# Patient Record
Sex: FEMALE | Race: WHITE | NOT HISPANIC OR LATINO
[De-identification: names, ages, dates, MRNs, and addresses within clinical notes are randomized per-mention and may not be internally consistent; named-entity substitution may affect disease eponyms.]

---

## 2019-10-25 ENCOUNTER — APPOINTMENT (OUTPATIENT)
Dept: OBGYN | Facility: CLINIC | Age: 39
End: 2019-10-25

## 2019-11-22 ENCOUNTER — APPOINTMENT (OUTPATIENT)
Dept: ANTEPARTUM | Facility: CLINIC | Age: 39
End: 2019-11-22
Payer: MEDICAID

## 2019-11-22 PROCEDURE — 76817 TRANSVAGINAL US OBSTETRIC: CPT

## 2019-11-22 PROCEDURE — 76811 OB US DETAILED SNGL FETUS: CPT

## 2019-12-09 ENCOUNTER — OUTPATIENT (OUTPATIENT)
Dept: OUTPATIENT SERVICES | Facility: HOSPITAL | Age: 39
LOS: 1 days | End: 2019-12-09
Payer: COMMERCIAL

## 2019-12-09 DIAGNOSIS — O26.899 OTHER SPECIFIED PREGNANCY RELATED CONDITIONS, UNSPECIFIED TRIMESTER: ICD-10-CM

## 2019-12-09 DIAGNOSIS — Z3A.00 WEEKS OF GESTATION OF PREGNANCY NOT SPECIFIED: ICD-10-CM

## 2019-12-09 LAB
APPEARANCE UR: CLEAR — SIGNIFICANT CHANGE UP
BACTERIA # UR AUTO: ABNORMAL /HPF
BILIRUB UR-MCNC: NEGATIVE — SIGNIFICANT CHANGE UP
COLOR SPEC: YELLOW — SIGNIFICANT CHANGE UP
DIFF PNL FLD: ABNORMAL
EPI CELLS # UR: ABNORMAL /HPF
GLUCOSE UR QL: NEGATIVE — SIGNIFICANT CHANGE UP
KETONES UR-MCNC: NEGATIVE — SIGNIFICANT CHANGE UP
LEUKOCYTE ESTERASE UR-ACNC: NEGATIVE — SIGNIFICANT CHANGE UP
NITRITE UR-MCNC: NEGATIVE — SIGNIFICANT CHANGE UP
PH UR: 7 — SIGNIFICANT CHANGE UP (ref 5–8)
PROT UR-MCNC: NEGATIVE — SIGNIFICANT CHANGE UP
RBC CASTS # UR COMP ASSIST: ABNORMAL /HPF (ref 0–2)
SP GR SPEC: 1.01 — SIGNIFICANT CHANGE UP (ref 1.01–1.02)
UROBILINOGEN FLD QL: NEGATIVE — SIGNIFICANT CHANGE UP
WBC UR QL: SIGNIFICANT CHANGE UP /HPF (ref 0–5)

## 2019-12-09 PROCEDURE — G0463: CPT

## 2019-12-09 PROCEDURE — 59025 FETAL NON-STRESS TEST: CPT

## 2019-12-09 PROCEDURE — 81001 URINALYSIS AUTO W/SCOPE: CPT

## 2019-12-09 PROCEDURE — 99282 EMERGENCY DEPT VISIT SF MDM: CPT

## 2019-12-20 ENCOUNTER — APPOINTMENT (OUTPATIENT)
Dept: OBGYN | Facility: CLINIC | Age: 39
End: 2019-12-20
Payer: MEDICAID

## 2019-12-20 PROBLEM — Z78.9 OTHER SPECIFIED HEALTH STATUS: Chronic | Status: ACTIVE | Noted: 2019-12-09

## 2019-12-20 PROCEDURE — 0502F SUBSEQUENT PRENATAL CARE: CPT

## 2020-01-10 ENCOUNTER — APPOINTMENT (OUTPATIENT)
Dept: OBGYN | Facility: CLINIC | Age: 40
End: 2020-01-10

## 2020-01-17 ENCOUNTER — APPOINTMENT (OUTPATIENT)
Dept: OBGYN | Facility: CLINIC | Age: 40
End: 2020-01-17

## 2020-01-21 ENCOUNTER — APPOINTMENT (OUTPATIENT)
Dept: ANTEPARTUM | Facility: CLINIC | Age: 40
End: 2020-01-21
Payer: MEDICAID

## 2020-01-21 PROCEDURE — 76819 FETAL BIOPHYS PROFIL W/O NST: CPT

## 2020-02-04 ENCOUNTER — APPOINTMENT (OUTPATIENT)
Dept: OBGYN | Facility: CLINIC | Age: 40
End: 2020-02-04
Payer: MEDICAID

## 2020-02-04 PROCEDURE — 76819 FETAL BIOPHYS PROFIL W/O NST: CPT

## 2020-02-04 PROCEDURE — 76816 OB US FOLLOW-UP PER FETUS: CPT

## 2020-02-14 PROBLEM — Z00.00 ENCOUNTER FOR PREVENTIVE HEALTH EXAMINATION: Status: ACTIVE | Noted: 2020-02-14

## 2020-02-21 ENCOUNTER — APPOINTMENT (OUTPATIENT)
Dept: OBGYN | Facility: CLINIC | Age: 40
End: 2020-02-21

## 2020-03-06 ENCOUNTER — APPOINTMENT (OUTPATIENT)
Dept: OBGYN | Facility: CLINIC | Age: 40
End: 2020-03-06

## 2020-03-08 ENCOUNTER — OUTPATIENT (OUTPATIENT)
Dept: OUTPATIENT SERVICES | Facility: HOSPITAL | Age: 40
LOS: 1 days | End: 2020-03-08
Payer: COMMERCIAL

## 2020-03-08 DIAGNOSIS — O26.899 OTHER SPECIFIED PREGNANCY RELATED CONDITIONS, UNSPECIFIED TRIMESTER: ICD-10-CM

## 2020-03-08 DIAGNOSIS — Z3A.00 WEEKS OF GESTATION OF PREGNANCY NOT SPECIFIED: ICD-10-CM

## 2020-03-08 PROCEDURE — 59025 FETAL NON-STRESS TEST: CPT

## 2020-03-08 PROCEDURE — 59025 FETAL NON-STRESS TEST: CPT | Mod: 26

## 2020-03-08 PROCEDURE — 82962 GLUCOSE BLOOD TEST: CPT

## 2020-03-08 PROCEDURE — G0463: CPT

## 2020-03-13 ENCOUNTER — APPOINTMENT (OUTPATIENT)
Dept: ANTEPARTUM | Facility: CLINIC | Age: 40
End: 2020-03-13
Payer: MEDICAID

## 2020-03-13 PROCEDURE — 76818 FETAL BIOPHYS PROFILE W/NST: CPT

## 2020-03-17 ENCOUNTER — APPOINTMENT (OUTPATIENT)
Dept: ANTEPARTUM | Facility: CLINIC | Age: 40
End: 2020-03-17
Payer: MEDICAID

## 2020-03-17 ENCOUNTER — APPOINTMENT (OUTPATIENT)
Dept: OBGYN | Facility: CLINIC | Age: 40
End: 2020-03-17

## 2020-03-17 PROCEDURE — 59025 FETAL NON-STRESS TEST: CPT

## 2020-03-20 ENCOUNTER — APPOINTMENT (OUTPATIENT)
Dept: ANTEPARTUM | Facility: CLINIC | Age: 40
End: 2020-03-20
Payer: MEDICAID

## 2020-03-20 PROCEDURE — 76819 FETAL BIOPHYS PROFIL W/O NST: CPT

## 2020-03-20 PROCEDURE — 76816 OB US FOLLOW-UP PER FETUS: CPT

## 2020-03-27 ENCOUNTER — APPOINTMENT (OUTPATIENT)
Dept: OBGYN | Facility: CLINIC | Age: 40
End: 2020-03-27
Payer: MEDICAID

## 2020-03-27 PROCEDURE — 0502F SUBSEQUENT PRENATAL CARE: CPT

## 2020-04-03 ENCOUNTER — APPOINTMENT (OUTPATIENT)
Dept: OBGYN | Facility: CLINIC | Age: 40
End: 2020-04-03
Payer: MEDICAID

## 2020-04-03 PROCEDURE — 76818 FETAL BIOPHYS PROFILE W/NST: CPT

## 2020-04-04 ENCOUNTER — INPATIENT (INPATIENT)
Facility: HOSPITAL | Age: 40
LOS: 2 days | Discharge: HOME | End: 2020-04-07
Attending: OBSTETRICS & GYNECOLOGY | Admitting: OBSTETRICS & GYNECOLOGY
Payer: MEDICAID

## 2020-04-04 VITALS
SYSTOLIC BLOOD PRESSURE: 124 MMHG | RESPIRATION RATE: 16 BRPM | HEART RATE: 88 BPM | TEMPERATURE: 98 F | DIASTOLIC BLOOD PRESSURE: 67 MMHG

## 2020-04-04 DIAGNOSIS — O26.899 OTHER SPECIFIED PREGNANCY RELATED CONDITIONS, UNSPECIFIED TRIMESTER: ICD-10-CM

## 2020-04-04 DIAGNOSIS — Z3A.00 WEEKS OF GESTATION OF PREGNANCY NOT SPECIFIED: ICD-10-CM

## 2020-04-04 LAB — GLUCOSE BLDC GLUCOMTR-MCNC: 97 MG/DL — SIGNIFICANT CHANGE UP (ref 70–99)

## 2020-04-04 RX ORDER — SODIUM CHLORIDE 9 MG/ML
1000 INJECTION, SOLUTION INTRAVENOUS
Refills: 0 | Status: DISCONTINUED | OUTPATIENT
Start: 2020-04-04 | End: 2020-04-04

## 2020-04-04 RX ORDER — OXYTOCIN 10 UNIT/ML
333.33 VIAL (ML) INJECTION
Qty: 20 | Refills: 0 | Status: DISCONTINUED | OUTPATIENT
Start: 2020-04-04 | End: 2020-04-07

## 2020-04-04 RX ORDER — SODIUM CHLORIDE 9 MG/ML
1000 INJECTION, SOLUTION INTRAVENOUS
Refills: 0 | Status: DISCONTINUED | OUTPATIENT
Start: 2020-04-04 | End: 2020-04-07

## 2020-04-04 NOTE — OB PROVIDER TRIAGE NOTE - NSOBPROVIDERNOTE_OBGYN_ALL_OB_FT
Pt of Dr. Graham: 38yo , at 39.2wks, presents to triage due to decreased fetal movement since early this afternoon. Pt reports feeling some movement now, but not as normal. Denies any vaginal bleeding or leakage of fluid. Pt is scheduled for Induction of Labor secondary to GDMA2  and cholestasis.    Allergies: denies  Meds: PNV, Basaglar 20units hs  PMH: denies  PSH: denies  OBGYN  -sab x 2  -Denies any GYN hx or STIs  -Current AP complications: GDMA2; Cholestasis    Assessment/Plan    VS: /68, HR 73, T 36.5  Lungs clear bilaterally, normal heart rate and rhythm  SVE: 2/60/-3  TAS: anterior placenta, vertex, BPP=8/8; RAMANA= 9.78cm; positive fetal movement seen and felt by patient  NST: , moderate variability, accels, no decels  Occasional contraction seen on toco  [id number]    -D/w Dr. Rodas; positive fetal movement. Pt admitted for scheduled IOL for GDMA2 and Cholestasis  -Routine labs  -PO cytotec  -GBS negative status  -Accuchecks and rotating fluids as per protocol

## 2020-04-04 NOTE — OB PROVIDER TRIAGE NOTE - NSHPPHYSICALEXAM_GEN_ALL_CORE
VS: /68, HR 73, T 36.5  Lungs clear bilaterally, normal heart rate and rhythm  SVE: 2/60/-3  TAS: anterior placenta, vertex, BPP=8/8; RAMANA= 9.78cm; positive fetal movement seen and felt by patient  NST: , moderate variability, accels, no decels  Occasional contraction seen on toco

## 2020-04-04 NOTE — OB PROVIDER TRIAGE NOTE - HISTORY OF PRESENT ILLNESS
Pt of Dr. Graham: 38yo , at 39.2wks, presents to triage due to decreased fetal movement since early this afternoon. Pt reports feeling some movement now, but not as normal. Denies any vaginal bleeding or leakage of fluid. Pt is scheduled for Induction of Labor secondary to GDMA2  and cholestasis.    Allergies: denies  Meds: PNV, Basaglar 20units hs  PMH: denies  PSH: denies  OBGYN  -sab x 2  -Denies any GYN hx or STIs  -Current AP complications: GDMA2; Cholestasis

## 2020-04-04 NOTE — OB RN TRIAGE NOTE - NS_TRIAGEADDITIONAL COMMENTS_OBGYN_ALL_OB_FT
pt for scheduled induction at 10p, came in early for decreased fetal movement pt for scheduled induction at 10p, came in early for decreased fetal movement FHR check prior to admitting procedure  145bpm @ 2050

## 2020-04-05 ENCOUNTER — TRANSCRIPTION ENCOUNTER (OUTPATIENT)
Age: 40
End: 2020-04-05

## 2020-04-05 LAB
BASOPHILS # BLD AUTO: 0.01 K/UL — SIGNIFICANT CHANGE UP (ref 0–0.2)
BASOPHILS NFR BLD AUTO: 0.1 % — SIGNIFICANT CHANGE UP (ref 0–2)
BLD GP AB SCN SERPL QL: NEGATIVE — SIGNIFICANT CHANGE UP
EOSINOPHIL # BLD AUTO: 0.02 K/UL — SIGNIFICANT CHANGE UP (ref 0–0.5)
EOSINOPHIL NFR BLD AUTO: 0.2 % — SIGNIFICANT CHANGE UP (ref 0–6)
GLUCOSE BLDC GLUCOMTR-MCNC: 79 MG/DL — SIGNIFICANT CHANGE UP (ref 70–99)
GLUCOSE BLDC GLUCOMTR-MCNC: 88 MG/DL — SIGNIFICANT CHANGE UP (ref 70–99)
GLUCOSE BLDC GLUCOMTR-MCNC: 89 MG/DL — SIGNIFICANT CHANGE UP (ref 70–99)
GLUCOSE BLDC GLUCOMTR-MCNC: 92 MG/DL — SIGNIFICANT CHANGE UP (ref 70–99)
GLUCOSE BLDC GLUCOMTR-MCNC: 95 MG/DL — SIGNIFICANT CHANGE UP (ref 70–99)
GLUCOSE BLDC GLUCOMTR-MCNC: 96 MG/DL — SIGNIFICANT CHANGE UP (ref 70–99)
HCT VFR BLD CALC: 38.1 % — SIGNIFICANT CHANGE UP (ref 34.5–45)
HGB BLD-MCNC: 12.5 G/DL — SIGNIFICANT CHANGE UP (ref 11.5–15.5)
IMM GRANULOCYTES NFR BLD AUTO: 0.9 % — SIGNIFICANT CHANGE UP (ref 0–1.5)
LYMPHOCYTES # BLD AUTO: 1.73 K/UL — SIGNIFICANT CHANGE UP (ref 1–3.3)
LYMPHOCYTES # BLD AUTO: 18.9 % — SIGNIFICANT CHANGE UP (ref 13–44)
MCHC RBC-ENTMCNC: 29.9 PG — SIGNIFICANT CHANGE UP (ref 27–34)
MCHC RBC-ENTMCNC: 32.8 % — SIGNIFICANT CHANGE UP (ref 32–36)
MCV RBC AUTO: 91.1 FL — SIGNIFICANT CHANGE UP (ref 80–100)
MONOCYTES # BLD AUTO: 0.61 K/UL — SIGNIFICANT CHANGE UP (ref 0–0.9)
MONOCYTES NFR BLD AUTO: 6.7 % — SIGNIFICANT CHANGE UP (ref 2–14)
NEUTROPHILS # BLD AUTO: 6.7 K/UL — SIGNIFICANT CHANGE UP (ref 1.8–7.4)
NEUTROPHILS NFR BLD AUTO: 73.2 % — SIGNIFICANT CHANGE UP (ref 43–77)
NRBC # FLD: 0 K/UL — SIGNIFICANT CHANGE UP (ref 0–0)
PLATELET # BLD AUTO: 202 K/UL — SIGNIFICANT CHANGE UP (ref 150–400)
PMV BLD: 10.5 FL — SIGNIFICANT CHANGE UP (ref 7–13)
RBC # BLD: 4.18 M/UL — SIGNIFICANT CHANGE UP (ref 3.8–5.2)
RBC # FLD: 15.3 % — HIGH (ref 10.3–14.5)
RH IG SCN BLD-IMP: POSITIVE — SIGNIFICANT CHANGE UP
RH IG SCN BLD-IMP: POSITIVE — SIGNIFICANT CHANGE UP
SARS-COV-2 RNA SPEC QL NAA+PROBE: SIGNIFICANT CHANGE UP
WBC # BLD: 9.15 K/UL — SIGNIFICANT CHANGE UP (ref 3.8–10.5)
WBC # FLD AUTO: 9.15 K/UL — SIGNIFICANT CHANGE UP (ref 3.8–10.5)

## 2020-04-05 PROCEDURE — 59400 OBSTETRICAL CARE: CPT | Mod: U9

## 2020-04-05 RX ORDER — LANOLIN
1 OINTMENT (GRAM) TOPICAL EVERY 6 HOURS
Refills: 0 | Status: DISCONTINUED | OUTPATIENT
Start: 2020-04-05 | End: 2020-04-07

## 2020-04-05 RX ORDER — DIBUCAINE 1 %
1 OINTMENT (GRAM) RECTAL EVERY 6 HOURS
Refills: 0 | Status: DISCONTINUED | OUTPATIENT
Start: 2020-04-05 | End: 2020-04-07

## 2020-04-05 RX ORDER — TETANUS TOXOID, REDUCED DIPHTHERIA TOXOID AND ACELLULAR PERTUSSIS VACCINE, ADSORBED 5; 2.5; 8; 8; 2.5 [IU]/.5ML; [IU]/.5ML; UG/.5ML; UG/.5ML; UG/.5ML
0.5 SUSPENSION INTRAMUSCULAR ONCE
Refills: 0 | Status: DISCONTINUED | OUTPATIENT
Start: 2020-04-05 | End: 2020-04-07

## 2020-04-05 RX ORDER — MAGNESIUM HYDROXIDE 400 MG/1
30 TABLET, CHEWABLE ORAL
Refills: 0 | Status: DISCONTINUED | OUTPATIENT
Start: 2020-04-05 | End: 2020-04-07

## 2020-04-05 RX ORDER — SIMETHICONE 80 MG/1
80 TABLET, CHEWABLE ORAL EVERY 4 HOURS
Refills: 0 | Status: DISCONTINUED | OUTPATIENT
Start: 2020-04-05 | End: 2020-04-07

## 2020-04-05 RX ORDER — CARBOPROST TROMETHAMINE 250 UG/ML
250 INJECTION, SOLUTION INTRAMUSCULAR ONCE
Refills: 0 | Status: COMPLETED | OUTPATIENT
Start: 2020-04-05 | End: 2020-04-05

## 2020-04-05 RX ORDER — BENZOCAINE 10 %
1 GEL (GRAM) MUCOUS MEMBRANE EVERY 6 HOURS
Refills: 0 | Status: DISCONTINUED | OUTPATIENT
Start: 2020-04-05 | End: 2020-04-07

## 2020-04-05 RX ORDER — IBUPROFEN 200 MG
1 TABLET ORAL
Qty: 0 | Refills: 0 | DISCHARGE
Start: 2020-04-05

## 2020-04-05 RX ORDER — OXYTOCIN 10 UNIT/ML
1 VIAL (ML) INJECTION
Qty: 30 | Refills: 0 | Status: DISCONTINUED | OUTPATIENT
Start: 2020-04-05 | End: 2020-04-05

## 2020-04-05 RX ORDER — IBUPROFEN 200 MG
600 TABLET ORAL EVERY 6 HOURS
Refills: 0 | Status: DISCONTINUED | OUTPATIENT
Start: 2020-04-05 | End: 2020-04-07

## 2020-04-05 RX ORDER — OXYTOCIN 10 UNIT/ML
2 VIAL (ML) INJECTION
Qty: 30 | Refills: 0 | Status: DISCONTINUED | OUTPATIENT
Start: 2020-04-05 | End: 2020-04-07

## 2020-04-05 RX ORDER — OXYTOCIN 10 UNIT/ML
333.33 VIAL (ML) INJECTION
Qty: 20 | Refills: 0 | Status: DISCONTINUED | OUTPATIENT
Start: 2020-04-05 | End: 2020-04-07

## 2020-04-05 RX ORDER — AER TRAVELER 0.5 G/1
1 SOLUTION RECTAL; TOPICAL EVERY 4 HOURS
Refills: 0 | Status: DISCONTINUED | OUTPATIENT
Start: 2020-04-05 | End: 2020-04-07

## 2020-04-05 RX ORDER — DIPHENOXYLATE HCL/ATROPINE 2.5-.025MG
2 TABLET ORAL ONCE
Refills: 0 | Status: DISCONTINUED | OUTPATIENT
Start: 2020-04-05 | End: 2020-04-05

## 2020-04-05 RX ORDER — KETOROLAC TROMETHAMINE 30 MG/ML
30 SYRINGE (ML) INJECTION ONCE
Refills: 0 | Status: DISCONTINUED | OUTPATIENT
Start: 2020-04-05 | End: 2020-04-07

## 2020-04-05 RX ORDER — DIPHENHYDRAMINE HCL 50 MG
25 CAPSULE ORAL EVERY 6 HOURS
Refills: 0 | Status: DISCONTINUED | OUTPATIENT
Start: 2020-04-05 | End: 2020-04-07

## 2020-04-05 RX ORDER — ACETAMINOPHEN 500 MG
975 TABLET ORAL
Refills: 0 | Status: DISCONTINUED | OUTPATIENT
Start: 2020-04-05 | End: 2020-04-07

## 2020-04-05 RX ORDER — GLYCERIN ADULT
1 SUPPOSITORY, RECTAL RECTAL AT BEDTIME
Refills: 0 | Status: DISCONTINUED | OUTPATIENT
Start: 2020-04-05 | End: 2020-04-07

## 2020-04-05 RX ORDER — OXYCODONE HYDROCHLORIDE 5 MG/1
5 TABLET ORAL ONCE
Refills: 0 | Status: DISCONTINUED | OUTPATIENT
Start: 2020-04-05 | End: 2020-04-07

## 2020-04-05 RX ORDER — ACETAMINOPHEN 500 MG
3 TABLET ORAL
Qty: 0 | Refills: 0 | DISCHARGE
Start: 2020-04-05

## 2020-04-05 RX ORDER — SODIUM CHLORIDE 9 MG/ML
3 INJECTION INTRAMUSCULAR; INTRAVENOUS; SUBCUTANEOUS EVERY 8 HOURS
Refills: 0 | Status: DISCONTINUED | OUTPATIENT
Start: 2020-04-05 | End: 2020-04-07

## 2020-04-05 RX ORDER — PRAMOXINE HYDROCHLORIDE 150 MG/15G
1 AEROSOL, FOAM RECTAL EVERY 4 HOURS
Refills: 0 | Status: DISCONTINUED | OUTPATIENT
Start: 2020-04-05 | End: 2020-04-07

## 2020-04-05 RX ORDER — INSULIN GLARGINE 100 [IU]/ML
20 INJECTION, SOLUTION SUBCUTANEOUS
Qty: 0 | Refills: 0 | DISCHARGE

## 2020-04-05 RX ORDER — HYDROCORTISONE 1 %
1 OINTMENT (GRAM) TOPICAL EVERY 6 HOURS
Refills: 0 | Status: DISCONTINUED | OUTPATIENT
Start: 2020-04-05 | End: 2020-04-07

## 2020-04-05 RX ORDER — OXYCODONE HYDROCHLORIDE 5 MG/1
5 TABLET ORAL
Refills: 0 | Status: DISCONTINUED | OUTPATIENT
Start: 2020-04-05 | End: 2020-04-07

## 2020-04-05 RX ADMIN — Medication 2 MILLIUNIT(S)/MIN: at 17:39

## 2020-04-05 RX ADMIN — SODIUM CHLORIDE 125 MILLILITER(S): 9 INJECTION, SOLUTION INTRAVENOUS at 01:29

## 2020-04-05 RX ADMIN — SODIUM CHLORIDE 125 MILLILITER(S): 9 INJECTION, SOLUTION INTRAVENOUS at 05:24

## 2020-04-05 RX ADMIN — Medication 0.2 MILLIGRAM(S): at 22:45

## 2020-04-05 RX ADMIN — CARBOPROST TROMETHAMINE 250 MICROGRAM(S): 250 INJECTION, SOLUTION INTRAMUSCULAR at 23:13

## 2020-04-05 RX ADMIN — Medication 2 TABLET(S): at 23:22

## 2020-04-05 NOTE — DISCHARGE NOTE OB - MEDICATION SUMMARY - MEDICATIONS TO TAKE
I will START or STAY ON the medications listed below when I get home from the hospital:    acetaminophen 325 mg oral tablet  -- 3 tab(s) by mouth , As Needed  -- Indication: For VAGINAL DELIVERY    ibuprofen 600 mg oral tablet  -- 1 tab(s) by mouth every 6 hours, As Needed  -- Indication: For VAGINAL DELIVERY

## 2020-04-05 NOTE — DISCHARGE NOTE OB - MEDICATION SUMMARY - MEDICATIONS TO STOP TAKING
I will STOP taking the medications listed below when I get home from the hospital:    Basaglar KwikPen  -- 20 unit(s) subcutaneous once a day (at bedtime)

## 2020-04-05 NOTE — DISCHARGE NOTE OB - MATERIALS PROVIDED
[facility] Hearing Screen Program/MMR Vaccination (VIS Pub Date: 2012)/Tdap Vaccination (VIS Pub Date: 2012)/Vaccinations/[facility]  Screening Program/Shaken Baby Prevention Handout/Breastfeeding Log/Guide to Postpartum Care

## 2020-04-05 NOTE — OB RN DELIVERY SUMMARY - NS_SEPSISRSKCALC_OBGYN_ALL_OB_FT
EOS calculated successfully. EOS Risk Factor: 0.5/1000 live births ([facility] [place] national incidence); GA=39w3d; Temp=98.96; ROM=0.45; GBS='Negative'; Antibiotics='No antibiotics or any antibiotics < 2 hrs prior to birth'

## 2020-04-05 NOTE — CHART NOTE - NSCHARTNOTEFT_GEN_A_CORE
40 yo , EGA@39.3 weeks c/o increased pain intensity with contractions; requesting an epidural.  Labor induction with PO Cytotec for ICP; Vital Signs: T(C): 36.5 (2020 13:39), Max: 37 (2020 01:35)  T(F): 97.7 (2020 13:39), Max: 98.6 (2020 01:35)  HR: 70 (2020 13:39) (61 - 88)  BP: 115/54 (2020 13:39) (98/62 - 124/67)  RR: 17 (2020 13:39) (16 - 17)  SpO2: 99% (2020 13:39) (97% - 99%)  category 1 FHTs, contractions every 3-5 minutes; SVE: 3/100/-1, bulging bag  Case was reviewed with Dr Graham, patient is approved for epidural; senior residents were updated; epidural is pending patient's trnasfer to L&D.    Yandy Murray CNM

## 2020-04-05 NOTE — OB RN PATIENT PROFILE - NS_PRENATALLABSOURCEGBS36_OBGYN_ALL_OB
Excessive Diarrhea/Pain not relieved by Medications/Increased Irritability or Sluggishness/Numbness, tingling/Bleeding that does not stop/Unable to Urinate/Inability to Tolerate Liquids or Foods/Swelling that continues/Persistent Nausea and Vomiting/Numbness, color, or temperature change to extremity/Fever greater than 101
hard copy, drawn during this pregnancy

## 2020-04-05 NOTE — DISCHARGE NOTE OB - CHANGE SANITARY PADS FREQUENTLY.  WASHING AND WIPING SHOULD OCCUR FROM FRONT TO BACK
Spinal Block Start time: 1/22/2019 7:35 AM 
End time: 1/22/2019 7:56 AM 
Performed by: Ronaldo Prado MD 
Authorized by: Ronaldo Prado MD  
 
Pre-procedure: Indications: primary anesthetic  Preanesthetic Checklist: patient identified, risks and benefits discussed, anesthesia consent, site marked, patient being monitored and timeout performed Spinal Block:  
Patient Position:  Seated Prep Region:  Lumbar Prep: DuraPrep Location:  L1-2 Technique:  Single shot Needle:  
Needle Type:  Quincke Needle Gauge:  22 G Attempts:  3 or more Events: CSF confirmed, no blood with aspiration and no paresthesia Assessment: 
Insertion:  Uncomplicated Patient tolerance:  Patient tolerated the procedure well with no immediate complications Statement Selected

## 2020-04-05 NOTE — PROGRESS NOTE ADULT - SUBJECTIVE AND OBJECTIVE BOX
Patient seen and examined.  No complains  On oral cytotec and FHR Tracing is category I.  Will try to place cervical balloon and continue induction of labor

## 2020-04-05 NOTE — DISCHARGE NOTE OB - PATIENT PORTAL LINK FT
You can access the FollowMyHealth Patient Portal offered by [facility] by registering at the following website: [email or web address]. By joining [facility]’s FollowMyHealth portal, you will also be able to view your health information using other applications (apps) compatible with our system.

## 2020-04-05 NOTE — DISCHARGE NOTE OB - CARE PLAN
Principal Discharge DX:	Vaginal delivery  Goal:	Recovery  Assessment and plan of treatment:	Nothing per vagina for 6wks  Secondary Diagnosis:	Postpartum hemorrhage, unspecified type

## 2020-04-05 NOTE — CHART NOTE - NSCHARTNOTEFT_GEN_A_CORE
r4 ob note    pt examined at bedside for early decelerations    SVE: /0  EFM:150/modvar/+acc/recurrent early dec  [place]:q2min    anticipate   Shannon, PGY-4  d/w Dr. Corbett

## 2020-04-05 NOTE — DISCHARGE NOTE OB - CARE PROVIDER_API CALL
Fidencio Graham)  MaternalFetal Medicine; Obstetrics and Gynecology  [address] [place] [address]  Phone: [phone]  Fax: [phone]  Follow Up Time:

## 2020-04-05 NOTE — OB RN DELIVERY SUMMARY - NSSKINTOSKINA_OBGYN_ALL_OB_DIFF_HHMM
07/01/18 1515   Provider Notification   Provider Name/Title Dr. Cespedes   Method of Notification At Bedside     MD at bedside discussing nonreactive NST. Per MD patient has option to stay for IOL, or have BPP to determine wellbeing. Patient would like BPP at this time. Per patient, believes she may have experienced SROM this morning, but has not noticed leaking since, fern test ordered due to spotting.   0 Hour(s) 10 Minute(s)

## 2020-04-05 NOTE — OB PROVIDER DELIVERY SUMMARY - NSPROVIDERDELIVERYNOTE_OBGYN_ALL_OB_FT
Viable male infant  PPH of 1L due to uterine atony. Cytotec, methergine, carboprost, and additional 20units of Pitocin administered with good effect.  Will continue Methergine series for 24hrs.

## 2020-04-05 NOTE — CHART NOTE - NSCHARTNOTEFT_GEN_A_CORE
OB Attg  Pt feels some pressure  , pos accels, moderate variability, no decels  toco q2-4min  VE FD/+1  Anticipate

## 2020-04-06 LAB
BASOPHILS # BLD AUTO: 0.03 K/UL — SIGNIFICANT CHANGE UP (ref 0–0.2)
BASOPHILS NFR BLD AUTO: 0.2 % — SIGNIFICANT CHANGE UP (ref 0–2)
EOSINOPHIL # BLD AUTO: 0.09 K/UL — SIGNIFICANT CHANGE UP (ref 0–0.5)
EOSINOPHIL NFR BLD AUTO: 0.5 % — SIGNIFICANT CHANGE UP (ref 0–6)
GLUCOSE BLDC GLUCOMTR-MCNC: 91 MG/DL — SIGNIFICANT CHANGE UP (ref 70–99)
HCT VFR BLD CALC: 26.4 % — LOW (ref 34.5–45)
HCT VFR BLD CALC: 29.5 % — LOW (ref 34.5–45)
HCT VFR BLD CALC: 36.1 % — SIGNIFICANT CHANGE UP (ref 34.5–45)
HGB BLD-MCNC: 11.7 G/DL — SIGNIFICANT CHANGE UP (ref 11.5–15.5)
HGB BLD-MCNC: 8.6 G/DL — LOW (ref 11.5–15.5)
HGB BLD-MCNC: 9.6 G/DL — LOW (ref 11.5–15.5)
IMM GRANULOCYTES NFR BLD AUTO: 0.4 % — SIGNIFICANT CHANGE UP (ref 0–1.5)
LYMPHOCYTES # BLD AUTO: 1.26 K/UL — SIGNIFICANT CHANGE UP (ref 1–3.3)
LYMPHOCYTES # BLD AUTO: 7 % — LOW (ref 13–44)
MCHC RBC-ENTMCNC: 29.8 PG — SIGNIFICANT CHANGE UP (ref 27–34)
MCHC RBC-ENTMCNC: 30.1 PG — SIGNIFICANT CHANGE UP (ref 27–34)
MCHC RBC-ENTMCNC: 30.2 PG — SIGNIFICANT CHANGE UP (ref 27–34)
MCHC RBC-ENTMCNC: 32.4 % — SIGNIFICANT CHANGE UP (ref 32–36)
MCHC RBC-ENTMCNC: 32.5 % — SIGNIFICANT CHANGE UP (ref 32–36)
MCHC RBC-ENTMCNC: 32.6 % — SIGNIFICANT CHANGE UP (ref 32–36)
MCV RBC AUTO: 91.3 FL — SIGNIFICANT CHANGE UP (ref 80–100)
MCV RBC AUTO: 92.5 FL — SIGNIFICANT CHANGE UP (ref 80–100)
MCV RBC AUTO: 93 FL — SIGNIFICANT CHANGE UP (ref 80–100)
MONOCYTES # BLD AUTO: 1.23 K/UL — HIGH (ref 0–0.9)
MONOCYTES NFR BLD AUTO: 6.8 % — SIGNIFICANT CHANGE UP (ref 2–14)
NEUTROPHILS # BLD AUTO: 15.34 K/UL — HIGH (ref 1.8–7.4)
NEUTROPHILS NFR BLD AUTO: 85.1 % — HIGH (ref 43–77)
NRBC # FLD: 0 K/UL — SIGNIFICANT CHANGE UP (ref 0–0)
PLATELET # BLD AUTO: 173 K/UL — SIGNIFICANT CHANGE UP (ref 150–400)
PLATELET # BLD AUTO: 175 K/UL — SIGNIFICANT CHANGE UP (ref 150–400)
PLATELET # BLD AUTO: 176 K/UL — SIGNIFICANT CHANGE UP (ref 150–400)
PMV BLD: 10.3 FL — SIGNIFICANT CHANGE UP (ref 7–13)
PMV BLD: 10.8 FL — SIGNIFICANT CHANGE UP (ref 7–13)
PMV BLD: 11.1 FL — SIGNIFICANT CHANGE UP (ref 7–13)
RBC # BLD: 2.89 M/UL — LOW (ref 3.8–5.2)
RBC # BLD: 3.19 M/UL — LOW (ref 3.8–5.2)
RBC # BLD: 3.88 M/UL — SIGNIFICANT CHANGE UP (ref 3.8–5.2)
RBC # FLD: 15.1 % — HIGH (ref 10.3–14.5)
T PALLIDUM AB TITR SER: NEGATIVE — SIGNIFICANT CHANGE UP
WBC # BLD: 14.39 K/UL — HIGH (ref 3.8–10.5)
WBC # BLD: 18.02 K/UL — HIGH (ref 3.8–10.5)
WBC # BLD: 18.92 K/UL — HIGH (ref 3.8–10.5)
WBC # FLD AUTO: 14.39 K/UL — HIGH (ref 3.8–10.5)
WBC # FLD AUTO: 18.02 K/UL — HIGH (ref 3.8–10.5)
WBC # FLD AUTO: 18.92 K/UL — HIGH (ref 3.8–10.5)

## 2020-04-06 RX ORDER — ONDANSETRON 8 MG/1
4 TABLET, FILM COATED ORAL ONCE
Refills: 0 | Status: COMPLETED | OUTPATIENT
Start: 2020-04-06 | End: 2020-04-06

## 2020-04-06 RX ORDER — FERROUS SULFATE 325(65) MG
325 TABLET ORAL THREE TIMES A DAY
Refills: 0 | Status: DISCONTINUED | OUTPATIENT
Start: 2020-04-06 | End: 2020-04-07

## 2020-04-06 RX ORDER — SODIUM CHLORIDE 9 MG/ML
1000 INJECTION, SOLUTION INTRAVENOUS ONCE
Refills: 0 | Status: COMPLETED | OUTPATIENT
Start: 2020-04-06 | End: 2020-04-06

## 2020-04-06 RX ORDER — ASCORBIC ACID 60 MG
500 TABLET,CHEWABLE ORAL DAILY
Refills: 0 | Status: DISCONTINUED | OUTPATIENT
Start: 2020-04-06 | End: 2020-04-07

## 2020-04-06 RX ADMIN — SODIUM CHLORIDE 3 MILLILITER(S): 9 INJECTION INTRAMUSCULAR; INTRAVENOUS; SUBCUTANEOUS at 18:21

## 2020-04-06 RX ADMIN — Medication 0.2 MILLIGRAM(S): at 03:01

## 2020-04-06 RX ADMIN — Medication 975 MILLIGRAM(S): at 08:23

## 2020-04-06 RX ADMIN — SODIUM CHLORIDE 3 MILLILITER(S): 9 INJECTION INTRAMUSCULAR; INTRAVENOUS; SUBCUTANEOUS at 15:49

## 2020-04-06 RX ADMIN — SODIUM CHLORIDE 1000 MILLILITER(S): 9 INJECTION, SOLUTION INTRAVENOUS at 00:20

## 2020-04-06 RX ADMIN — Medication 975 MILLIGRAM(S): at 20:27

## 2020-04-06 RX ADMIN — Medication 0.2 MILLIGRAM(S): at 12:33

## 2020-04-06 RX ADMIN — Medication 0.2 MILLIGRAM(S): at 07:06

## 2020-04-06 RX ADMIN — Medication 1000 MILLIUNIT(S)/MIN: at 00:13

## 2020-04-06 RX ADMIN — Medication 1 TABLET(S): at 12:33

## 2020-04-06 RX ADMIN — ONDANSETRON 4 MILLIGRAM(S): 8 TABLET, FILM COATED ORAL at 00:05

## 2020-04-06 RX ADMIN — SODIUM CHLORIDE 3 MILLILITER(S): 9 INJECTION INTRAMUSCULAR; INTRAVENOUS; SUBCUTANEOUS at 22:00

## 2020-04-06 RX ADMIN — Medication 0.2 MILLIGRAM(S): at 15:49

## 2020-04-06 RX ADMIN — Medication 0.2 MILLIGRAM(S): at 18:21

## 2020-04-06 NOTE — PROGRESS NOTE ADULT - SUBJECTIVE AND OBJECTIVE BOX
S: Patient doing well. No complaints. Minimal lochia. Pain controlled.    O: Vital Signs Last 24 Hrs  T(C): 36.9 (2020 06:45), Max: 37.2 (2020 20:42)  T(F): 98.5 (2020 06:45), Max: 98.96 (2020 20:42)  HR: 81 (2020 06:45) (48 - 113)  BP: 111/71 (2020 06:45) (85/52 - 134/73)  BP(mean): 87 (2020 03:45) (74 - 87)  RR: 18 (2020 06:45) (14 - 20)  SpO2: 100% (2020 06:45) (91% - 100%)    Gen: NAD  Abd: soft, Nontender, Nondistended, fundus firm  Ext: no tendern, mild edema    Labs:                        9.6    18.92 )-----------( 175      ( 2020 03:30 )             29.5       A: 39y PPD#1  s/p   with postpartum hemorrhage doing well.    Plan:  Routine postpartum care  Encouraged out of bed  Regular diet  Discharge home

## 2020-04-06 NOTE — LACTATION INITIAL EVALUATION - INTERVENTION OUTCOME
verbalizes understanding/nbn  not nursing  effective ,  reviewed  strategies to bring  out  nipple.  if  discharged  today  instructed  to  follow  with  peds in  24 hours .  pt instructed to notify pediatrician if nbn not feeding 8-12 times/24 hours and not voiding and stooling according to breastfeeding log. outside lactation resources given  with  warm line  and support group  mother verbalized  understanding  of  lactation  instructions  with  teach  back  method

## 2020-04-06 NOTE — PROVIDER CONTACT NOTE (OTHER) - ASSESSMENT
Pt A&Ox3, Vitals signs stable, no c/o headache
VS :  BP 95/56 P 56 when lying flat  BP 91/58 P 72 when sitting  upon standing patient complains of being lightheaded and dizziness, patient sat back in bed immediately
DM, HTN

## 2020-04-06 NOTE — LACTATION INITIAL EVALUATION - LACTATION INTERVENTIONS
initiate skin to skin/initiate hand expression routine/assisted with deep latch and positioning discussed  signs  of  effective  feeding and  swallowing.  discussed  compression at  breast when  nbn  stops  drinking  and  is  still sucking.  instructed  to offer both  breast at a feeding ,feed on cue and safe  skin to skin. .  reviewed  new  beginnings  book  breastfeeding  section .  if  nbn  not  breastfeeding  effectively  hand  express  and  pump  and   give expressed

## 2020-04-07 VITALS
DIASTOLIC BLOOD PRESSURE: 65 MMHG | OXYGEN SATURATION: 99 % | RESPIRATION RATE: 16 BRPM | SYSTOLIC BLOOD PRESSURE: 113 MMHG | TEMPERATURE: 98 F | HEART RATE: 81 BPM

## 2020-04-07 RX ADMIN — Medication 975 MILLIGRAM(S): at 03:52

## 2020-04-07 RX ADMIN — Medication 975 MILLIGRAM(S): at 12:55

## 2020-04-07 RX ADMIN — Medication 975 MILLIGRAM(S): at 03:00

## 2020-04-07 RX ADMIN — Medication 325 MILLIGRAM(S): at 12:55

## 2020-04-07 RX ADMIN — Medication 1 TABLET(S): at 12:55

## 2020-05-19 ENCOUNTER — APPOINTMENT (OUTPATIENT)
Dept: OBGYN | Facility: CLINIC | Age: 40
End: 2020-05-19

## 2020-06-05 NOTE — OB RN TRIAGE NOTE - CURRENT PREGNANCY COMPLICATIONS, OB PROFILE
insulin/Gestational Diabetes Other/insulin, Cholestasis of pregnancy/Gestational Diabetes Cheiloplasty (Complex) Text: A decision was made to reconstruct the defect with a  cheiloplasty.  The defect was undermined extensively.  Additional obicularis oris muscle was excised with a 15 blade scalpel.  The defect was converted into a full thickness wedge to facilite a better cosmetic result.  Small vessels were then tied off with 5-0 monocyrl. The obicularis oris, superficial fascia, adipose and dermis were then reapproximated.  After the deeper layers were approximated the epidermis was reapproximated with particular care given to realign the vermilion border.

## 2020-07-24 ENCOUNTER — APPOINTMENT (OUTPATIENT)
Dept: OBGYN | Facility: CLINIC | Age: 40
End: 2020-07-24
Payer: MEDICAID

## 2020-07-24 PROCEDURE — 99213 OFFICE O/P EST LOW 20 MIN: CPT

## 2020-08-03 NOTE — OB PROVIDER H&P - LABOR: BISHOP SCORE
5 Crescentic Complex Repair Preamble Text (Leave Blank If You Do Not Want): Extensive wide undermining was performed.

## 2020-09-18 ENCOUNTER — APPOINTMENT (OUTPATIENT)
Dept: OBGYN | Facility: CLINIC | Age: 40
End: 2020-09-18
Payer: MEDICAID

## 2020-09-18 PROCEDURE — 99395 PREV VISIT EST AGE 18-39: CPT

## 2021-01-01 NOTE — OB PROVIDER H&P - ASSESSMENT
2021 Pt of Dr. Graham: 38yo , at 39.2wks, presents to triage due to decreased fetal movement since early this afternoon. Pt reports feeling some movement now, but not as normal. Denies any vaginal bleeding or leakage of fluid. Pt is scheduled for Induction of Labor secondary to GDMA2  and cholestasis.    Allergies: denies  Meds: PNV, Basaglar 20units hs  PMH: denies  PSH: denies  OBGYN  -sab x 2  -Denies any GYN hx or STIs  -Current AP complications: GDMA2; Cholestasis/ elevated liver enzymes    Assessment/Plan    VS: /68, HR 73, T 36.5  Lungs clear bilaterally, normal heart rate and rhythm  SVE: 2/60/-3  TAS: anterior placenta, vertex, BPP=8/8; RAMANA= 9.78cm; positive fetal movement seen and felt by patient  NST: , moderate variability, accels, no decels  Occasional contraction seen on toco  [id number]    -D/w Dr. Rodas; positive fetal movement. Pt admitted for scheduled IOL for GDMA2 and Cholestasis  -Routine labs  -PO cytotec  -GBS negative status  -Accuchecks and rotating fluids as per protocol Pt of Dr. Graham: 38yo , at 39.2wks, presents to triage due to decreased fetal movement since early this afternoon. Pt reports feeling some movement now, but not as normal. Denies any vaginal bleeding or leakage of fluid. Pt is scheduled for Induction of Labor secondary to GDMA2  and cholestasis.    Allergies: denies  Meds: PNV, Basaglar 20units hs  PMH: denies  PSH: denies  OBGYN  -sab x 2  -Denies any GYN hx or STIs  -Current AP complications: GDMA2; Cholestasis/ elevated liver enzymes    Assessment/Plan    VS: /68, HR 73, T 36.5  Lungs clear bilaterally, normal heart rate and rhythm  SVE: 2/60/-3  TAS: anterior placenta, vertex, BPP=8/8; RAMANA= 9.78cm; positive fetal movement seen and felt by patient  NST: , moderate variability, accels, no decels  Occasional contraction seen on toco  [id number]    -D/w Dr. Rodas; positive fetal movement. Pt admitted for scheduled IOL for GDMA2 and Cholestasis  -Routine labs  -PO cytotec  -GBS negative status  -Accuchecks and rotating fluids as per protocol  -FS on admission: 97

## 2021-03-01 NOTE — OB PROVIDER TRIAGE NOTE - NS_FETALMONCTXRES_OBGYN_ALL_OB
Blood Specimen collected by Hansel Jaramillo. Blood Specimen collection kit was checked to make sure it was not  by myself and Allen Guzmán RN. All components were removed from kit. Tourniquet was applied to patients upper right arm. Right arm was cleansed with alcohol-free povidone iodine prep pad that was provided. Patient's skin was punctured with a 23G butterfuly needle in right antecubital vein. Both gray top vials that were provided were filled to the maximum volume. This RN inverted both vials several times. Both vials were sealed by placing a specimen seal over the stoppers. Both certificates of blood withdraw were completed by [facility] officer and myself. Both certificates were attached to vials as directed. The vials were placed back in the fallon and then placed inside the ziplock bag that was provided. Ziplock bag was placed inside the kit. The kit was closed and sealed with tamper evident seal that was provided. The kit was handed to the That{img}[facility] The above actions were performed in front of the patient and [address] officer. Relaxed

## 2021-11-29 NOTE — PROGRESS NOTE ADULT - SUBJECTIVE AND OBJECTIVE BOX
Subjective  Pain: well controlled  Complaints:none  Milestones: Alert and orientedx3. Out of bed ambulating. Voiding/Due to void. Tolerating a regular diet.  Infant feeding:  bottle                               Feeding related issues or concerns:none    Objective   T(C): 36.7 (04-07-20 @ 06:28), Max: 36.7 (04-06-20 @ 18:21)  HR: 81 (04-07-20 @ 06:28) (81 - 100)  BP: 113/65 (04-07-20 @ 06:28) (95/63 - 113/65)  RR: 16 (04-07-20 @ 06:28) (16 - 18)  SpO2: 99% (04-07-20 @ 06:28) (98% - 99%)  Wt(kg): --      Assessment     40 y/o G3  P 1 .Day # 1 postpartum. Uterine atony  PPH EBL 1000cc s/p cytotec, haemabate, methergine  Assisted delivery (vacuum, forceps):n/a  Indication for assisted delivery:  Past medical history significant fornone  Current Issues: none  Breasts: soft  Abdomen: Soft, nontender, nondistended.  Vagina: Lochia moderate  Perineum:  2nd degree laceration,   Laceration/episiotomy site: clean  Lower extremities: No edema noted bilaterally of lower extremities. Nontender calves.  Negative Pavel's sign. Positive pedal pulses.  Other relevant physical exam findings;none      Plan  Plan: Increase ambulation, analgesia PRN and pain medication protocal standing oxycodone, ibuprofen and acetaminophen.  Diet: Continue regular diet  Continue routine postpartum care. No abnormalities noted

## 2022-07-24 ENCOUNTER — EMERGENCY (EMERGENCY)
Facility: HOSPITAL | Age: 42
LOS: 1 days | Discharge: HOME | End: 2022-07-24
Attending: EMERGENCY MEDICINE
Payer: MEDICAID

## 2022-07-24 VITALS
WEIGHT: 143.08 LBS | HEIGHT: 63 IN | OXYGEN SATURATION: 97 % | TEMPERATURE: 98 F | DIASTOLIC BLOOD PRESSURE: 68 MMHG | SYSTOLIC BLOOD PRESSURE: 107 MMHG | HEART RATE: 61 BPM | RESPIRATION RATE: 18 BRPM

## 2022-07-24 LAB
ALBUMIN SERPL ELPH-MCNC: 3.2 G/DL — LOW (ref 3.5–5)
ALP SERPL-CCNC: 49 U/L — SIGNIFICANT CHANGE UP (ref 40–120)
ALT FLD-CCNC: 32 U/L DA — SIGNIFICANT CHANGE UP (ref 10–60)
ANION GAP SERPL CALC-SCNC: 7 MMOL/L — SIGNIFICANT CHANGE UP (ref 5–17)
APPEARANCE UR: CLEAR — SIGNIFICANT CHANGE UP
AST SERPL-CCNC: 23 U/L — SIGNIFICANT CHANGE UP (ref 10–40)
BACTERIA # UR AUTO: ABNORMAL /HPF
BASOPHILS # BLD AUTO: 0.03 K/UL — SIGNIFICANT CHANGE UP (ref 0–0.2)
BASOPHILS NFR BLD AUTO: 0.4 % — SIGNIFICANT CHANGE UP (ref 0–2)
BILIRUB SERPL-MCNC: 0.5 MG/DL — SIGNIFICANT CHANGE UP (ref 0.2–1.2)
BILIRUB UR-MCNC: NEGATIVE — SIGNIFICANT CHANGE UP
BUN SERPL-MCNC: 10 MG/DL — SIGNIFICANT CHANGE UP (ref 7–18)
CALCIUM SERPL-MCNC: 9.2 MG/DL — SIGNIFICANT CHANGE UP (ref 8.4–10.5)
CHLORIDE SERPL-SCNC: 106 MMOL/L — SIGNIFICANT CHANGE UP (ref 96–108)
CO2 SERPL-SCNC: 26 MMOL/L — SIGNIFICANT CHANGE UP (ref 22–31)
COLOR SPEC: YELLOW — SIGNIFICANT CHANGE UP
CREAT SERPL-MCNC: 0.62 MG/DL — SIGNIFICANT CHANGE UP (ref 0.5–1.3)
DIFF PNL FLD: ABNORMAL
EGFR: 115 ML/MIN/1.73M2 — SIGNIFICANT CHANGE UP
EOSINOPHIL # BLD AUTO: 0.14 K/UL — SIGNIFICANT CHANGE UP (ref 0–0.5)
EOSINOPHIL NFR BLD AUTO: 1.7 % — SIGNIFICANT CHANGE UP (ref 0–6)
EPI CELLS # UR: SIGNIFICANT CHANGE UP /HPF
GLUCOSE SERPL-MCNC: 89 MG/DL — SIGNIFICANT CHANGE UP (ref 70–99)
GLUCOSE UR QL: NEGATIVE — SIGNIFICANT CHANGE UP
HCG SERPL-ACNC: 75 MIU/ML — HIGH
HCT VFR BLD CALC: 36.4 % — SIGNIFICANT CHANGE UP (ref 34.5–45)
HGB BLD-MCNC: 11.8 G/DL — SIGNIFICANT CHANGE UP (ref 11.5–15.5)
IMM GRANULOCYTES NFR BLD AUTO: 0.2 % — SIGNIFICANT CHANGE UP (ref 0–1.5)
KETONES UR-MCNC: NEGATIVE — SIGNIFICANT CHANGE UP
LEUKOCYTE ESTERASE UR-ACNC: ABNORMAL
LYMPHOCYTES # BLD AUTO: 2.36 K/UL — SIGNIFICANT CHANGE UP (ref 1–3.3)
LYMPHOCYTES # BLD AUTO: 29.1 % — SIGNIFICANT CHANGE UP (ref 13–44)
MCHC RBC-ENTMCNC: 29.1 PG — SIGNIFICANT CHANGE UP (ref 27–34)
MCHC RBC-ENTMCNC: 32.4 GM/DL — SIGNIFICANT CHANGE UP (ref 32–36)
MCV RBC AUTO: 89.9 FL — SIGNIFICANT CHANGE UP (ref 80–100)
MONOCYTES # BLD AUTO: 0.59 K/UL — SIGNIFICANT CHANGE UP (ref 0–0.9)
MONOCYTES NFR BLD AUTO: 7.3 % — SIGNIFICANT CHANGE UP (ref 2–14)
NEUTROPHILS # BLD AUTO: 4.96 K/UL — SIGNIFICANT CHANGE UP (ref 1.8–7.4)
NEUTROPHILS NFR BLD AUTO: 61.3 % — SIGNIFICANT CHANGE UP (ref 43–77)
NITRITE UR-MCNC: NEGATIVE — SIGNIFICANT CHANGE UP
NRBC # BLD: 0 /100 WBCS — SIGNIFICANT CHANGE UP (ref 0–0)
PH UR: 8 — SIGNIFICANT CHANGE UP (ref 5–8)
PLATELET # BLD AUTO: 281 K/UL — SIGNIFICANT CHANGE UP (ref 150–400)
POTASSIUM SERPL-MCNC: 4.1 MMOL/L — SIGNIFICANT CHANGE UP (ref 3.5–5.3)
POTASSIUM SERPL-SCNC: 4.1 MMOL/L — SIGNIFICANT CHANGE UP (ref 3.5–5.3)
PROT SERPL-MCNC: 7.5 G/DL — SIGNIFICANT CHANGE UP (ref 6–8.3)
PROT UR-MCNC: NEGATIVE — SIGNIFICANT CHANGE UP
RAPID RVP RESULT: SIGNIFICANT CHANGE UP
RBC # BLD: 4.05 M/UL — SIGNIFICANT CHANGE UP (ref 3.8–5.2)
RBC # FLD: 13.2 % — SIGNIFICANT CHANGE UP (ref 10.3–14.5)
RBC CASTS # UR COMP ASSIST: SIGNIFICANT CHANGE UP /HPF (ref 0–2)
SARS-COV-2 RNA SPEC QL NAA+PROBE: SIGNIFICANT CHANGE UP
SODIUM SERPL-SCNC: 139 MMOL/L — SIGNIFICANT CHANGE UP (ref 135–145)
SP GR SPEC: 1.01 — SIGNIFICANT CHANGE UP (ref 1.01–1.02)
UROBILINOGEN FLD QL: NEGATIVE — SIGNIFICANT CHANGE UP
WBC # BLD: 8.1 K/UL — SIGNIFICANT CHANGE UP (ref 3.8–10.5)
WBC # FLD AUTO: 8.1 K/UL — SIGNIFICANT CHANGE UP (ref 3.8–10.5)
WBC UR QL: SIGNIFICANT CHANGE UP /HPF (ref 0–5)

## 2022-07-24 PROCEDURE — 87086 URINE CULTURE/COLONY COUNT: CPT

## 2022-07-24 PROCEDURE — 99284 EMERGENCY DEPT VISIT MOD MDM: CPT | Mod: 25

## 2022-07-24 PROCEDURE — 81001 URINALYSIS AUTO W/SCOPE: CPT

## 2022-07-24 PROCEDURE — 85025 COMPLETE CBC W/AUTO DIFF WBC: CPT

## 2022-07-24 PROCEDURE — 76817 TRANSVAGINAL US OBSTETRIC: CPT | Mod: 26

## 2022-07-24 PROCEDURE — 76801 OB US < 14 WKS SINGLE FETUS: CPT

## 2022-07-24 PROCEDURE — 36415 COLL VENOUS BLD VENIPUNCTURE: CPT

## 2022-07-24 PROCEDURE — 76801 OB US < 14 WKS SINGLE FETUS: CPT | Mod: 26

## 2022-07-24 PROCEDURE — 84702 CHORIONIC GONADOTROPIN TEST: CPT

## 2022-07-24 PROCEDURE — 76817 TRANSVAGINAL US OBSTETRIC: CPT

## 2022-07-24 PROCEDURE — 96360 HYDRATION IV INFUSION INIT: CPT

## 2022-07-24 PROCEDURE — 96361 HYDRATE IV INFUSION ADD-ON: CPT

## 2022-07-24 PROCEDURE — 99285 EMERGENCY DEPT VISIT HI MDM: CPT

## 2022-07-24 PROCEDURE — 80053 COMPREHEN METABOLIC PANEL: CPT

## 2022-07-24 PROCEDURE — 0225U NFCT DS DNA&RNA 21 SARSCOV2: CPT

## 2022-07-24 RX ORDER — SODIUM CHLORIDE 9 MG/ML
1000 INJECTION INTRAMUSCULAR; INTRAVENOUS; SUBCUTANEOUS ONCE
Refills: 0 | Status: COMPLETED | OUTPATIENT
Start: 2022-07-24 | End: 2022-07-24

## 2022-07-24 RX ADMIN — SODIUM CHLORIDE 2000 MILLILITER(S): 9 INJECTION INTRAMUSCULAR; INTRAVENOUS; SUBCUTANEOUS at 11:53

## 2022-07-24 RX ADMIN — SODIUM CHLORIDE 1000 MILLILITER(S): 9 INJECTION INTRAMUSCULAR; INTRAVENOUS; SUBCUTANEOUS at 13:58

## 2022-07-24 NOTE — ED PROVIDER NOTE - CLINICAL SUMMARY MEDICAL DECISION MAKING FREE TEXT BOX
41 year old female presents to the ED with complaints of lower abdominal pain. Will check labs, perform ultrasound to assess fetus, and administer IV fluids. Patient offered Tylenol for pain, which she declined.

## 2022-07-24 NOTE — ED PROVIDER NOTE - NS ED SCRIBE STATEMENT
Called patient to give results and she stated that she wanted you to call her, she  Was hoping you  Would call her for the results anyways but seems to want you to call her cause she is concerned    NAM

## 2022-07-24 NOTE — ED PROVIDER NOTE - OBJECTIVE STATEMENT
41 year old  female with no significant PMHx and LMP on 22 presenting to the ED with complaints of lower abdominal pain. Patient states that this week she had a positive home pregnancy test, and reports experiencing lower abdominal/pelvic pain over the last couple of days. Patient additionally reports that her  tested positive for COVID-19 six days ago, at which time patient also had some body aches which have mostly resolved. Patient notes that she had a PCR COVID-19 test which was negative. Patient denies any chest pain, shortness of breath, fevers, chills, vaginal bleeding or discharge, and all other acute complaints. NKDA.

## 2022-07-24 NOTE — ED ADULT NURSE NOTE - NSIMPLEMENTINTERV_GEN_ALL_ED
Implemented All Universal Safety Interventions:  [place] to call system. Call bell, personal items and telephone within reach. Instruct patient to call for assistance. Room bathroom lighting operational. Non-slip footwear when patient is off stretcher. Physically safe environment: no spills, clutter or unnecessary equipment. Stretcher in lowest position, wheels locked, appropriate side rails in place.

## 2022-07-24 NOTE — ED PROVIDER NOTE - ATTENDING APP SHARED VISIT CONTRIBUTION OF CARE
seen with acp  c/o lower abdominal pain  patient had a positive home pregnancy test  hcg levels only 75  sono did not show and iup  question early pregnancy vs   will repeat studies in 3-5 days  agree with acps assessment hx and physical and disposition

## 2022-07-24 NOTE — ED PROVIDER NOTE - NS ED ATTENDING STATEMENT MOD
This was a shared visit with the NAM. I reviewed and verified the documentation and independently performed the documented:

## 2022-07-24 NOTE — ED PROVIDER NOTE - PROGRESS NOTE DETAILS
Patient reports feeling better.  Labs including HCG and US also noted and discussed with patient.  Patient instructed to have repeat HCG done in 3 days either in the OBGYN clinic or in the ER and patient agreeable.  Return to ER precautions also provided.  Patient left well appearing, steady gait.

## 2022-07-24 NOTE — ED PROVIDER NOTE - NSFOLLOWUPINSTRUCTIONS_ED_ALL_ED_FT
Abdominal Pain During Pregnancy      Abdominal pain is common during pregnancy and has many possible causes. Some causes are more serious than others, and sometimes the cause is not known.    Abdominal pain can be a sign that labor is starting. It can also be caused by normal growth of your baby causing stretching of muscles and ligaments during pregnancy. Always tell your health care provider if you have any abdominal pain.      Follow these instructions at home:     • Do not have sex or put anything in your vagina until your pain goes away completely.      •Get plenty of rest until your pain improves.      •Drink enough fluid to keep your urine pale yellow.      •Take over-the-counter and prescription medicines only as told by your health care provider.      •Keep all follow-up visits. This is important.        Contact a health care provider if:    •Your pain continues or gets worse after resting.    •You have lower abdominal pain that:  •Comes and goes at regular intervals.      •Spreads to your back.      •Is similar to menstrual cramps.        •You have pain or burning when you urinate.        Get help right away if:    •You have a fever, chills, or shortness of breath.      •You have vaginal bleeding.      •You are leaking fluid or passing tissue from your vagina.      •You have vomiting or diarrhea that lasts for more than 24 hours.      •Your baby is moving less than usual.      •You feel very weak or faint.      •You develop severe pain in your upper abdomen.        Summary    •Abdominal pain is common during pregnancy and has many possible causes.      •If you experience abdominal pain during pregnancy, tell your health care provider right away.      •Follow your health care provider's home care instructions and keep all follow-up visits as told.      This information is not intended to replace advice given to you by your health care provider. Make sure you discuss any questions you have with your health care provider.    ***as discussed please have repeat lab work (HCG) done in 3 days either in the ER or at the OBGYN clinic***

## 2022-07-24 NOTE — ED PROVIDER NOTE - PATIENT PORTAL LINK FT
You can access the FollowMyHealth Patient Portal offered by [facility] by registering at the following website: [email or web address]. By joining [employer] [facility]’s FollowMyHealth portal, you will also be able to view your health information using other applications (apps) compatible with our system.

## 2022-07-26 LAB
CULTURE RESULTS: SIGNIFICANT CHANGE UP
SPECIMEN SOURCE: SIGNIFICANT CHANGE UP

## 2023-09-19 ENCOUNTER — APPOINTMENT (OUTPATIENT)
Dept: OBGYN | Facility: CLINIC | Age: 43
End: 2023-09-19
Payer: MEDICAID

## 2023-09-19 VITALS
SYSTOLIC BLOOD PRESSURE: 110 MMHG | HEIGHT: 63 IN | DIASTOLIC BLOOD PRESSURE: 68 MMHG | WEIGHT: 154 LBS | BODY MASS INDEX: 27.29 KG/M2

## 2023-09-19 DIAGNOSIS — N92.6 IRREGULAR MENSTRUATION, UNSPECIFIED: ICD-10-CM

## 2023-09-19 PROCEDURE — 99213 OFFICE O/P EST LOW 20 MIN: CPT

## 2023-09-19 RX ORDER — ASCORBIC ACID, CHOLECALCIFEROL, .ALPHA.-TOCOPHEROL ACETATE, DL-, PYRIDOXINE, FOLIC ACID, CYANOCOBALAMIN, CALCIUM, FERROUS FUMARATE, MAGNESIUM, DOCONEXENT 85; 200; 10; 25; 1; 12; 140; 27; 45; 300 [IU]/1; [IU]/1; [IU]/1; [IU]/1; MG/1; UG/1; MG/1; MG/1; MG/1; MG/1
27-0.6-0.4-3 CAPSULE, GELATIN COATED ORAL
Qty: 60 | Refills: 4 | Status: ACTIVE | COMMUNITY
Start: 2023-09-19 | End: 1900-01-01

## 2023-09-21 LAB
ABO + RH PNL BLD: NORMAL
APPEARANCE: CLEAR
BACTERIA UR CULT: NORMAL
BACTERIA: NEGATIVE /HPF
BILIRUBIN URINE: NEGATIVE
BLD GP AB SCN SERPL QL: NORMAL
BLOOD URINE: ABNORMAL
CAST: 0 /LPF
COLOR: YELLOW
EPITHELIAL CELLS: 2 /HPF
ESTIMATED AVERAGE GLUCOSE: 111 MG/DL
GLUCOSE QUALITATIVE U: NEGATIVE MG/DL
GLUCOSE SERPL-MCNC: 93 MG/DL
HBA1C MFR BLD HPLC: 5.5 %
HBV SURFACE AG SER QL: NONREACTIVE
HCG SERPL-MCNC: ABNORMAL MIU/ML
HCT VFR BLD CALC: 35.6 %
HCV AB SER QL: NONREACTIVE
HCV S/CO RATIO: 0.07 S/CO
HGB BLD-MCNC: 11.2 G/DL
HIV1+2 AB SPEC QL IA.RAPID: NONREACTIVE
KETONES URINE: NEGATIVE MG/DL
LEAD BLD-MCNC: <1 UG/DL
LEUKOCYTE ESTERASE URINE: ABNORMAL
M TB IFN-G BLD-IMP: NEGATIVE
MCHC RBC-ENTMCNC: 29.4 PG
MCHC RBC-ENTMCNC: 31.5 GM/DL
MCV RBC AUTO: 93.4 FL
MEV IGG FLD QL IA: >300 AU/ML
MEV IGG+IGM SER-IMP: POSITIVE
MICROSCOPIC-UA: NORMAL
MUV AB SER-ACNC: POSITIVE
MUV IGG SER QL IA: 89.7 AU/ML
NITRITE URINE: NEGATIVE
PH URINE: 7
PLATELET # BLD AUTO: 281 K/UL
PROTEIN URINE: NEGATIVE MG/DL
QUANTIFERON TB PLUS MITOGEN MINUS NIL: 7.69 IU/ML
QUANTIFERON TB PLUS NIL: 0.01 IU/ML
QUANTIFERON TB PLUS TB1 MINUS NIL: 0 IU/ML
QUANTIFERON TB PLUS TB2 MINUS NIL: 0.01 IU/ML
RBC # BLD: 3.81 M/UL
RBC # FLD: 13.7 %
RED BLOOD CELLS URINE: 0 /HPF
REVIEW: NORMAL
RUBV IGG FLD-ACNC: 1.6 INDEX
RUBV IGG SER-IMP: POSITIVE
SPECIFIC GRAVITY URINE: 1.01
UROBILINOGEN URINE: 0.2 MG/DL
WBC # FLD AUTO: 10.54 K/UL
WHITE BLOOD CELLS URINE: 0 /HPF

## 2023-09-28 ENCOUNTER — APPOINTMENT (OUTPATIENT)
Dept: OBGYN | Facility: CLINIC | Age: 43
End: 2023-09-28
Payer: MEDICAID

## 2023-09-28 ENCOUNTER — APPOINTMENT (OUTPATIENT)
Dept: ANTEPARTUM | Facility: CLINIC | Age: 43
End: 2023-09-28
Payer: MEDICAID

## 2023-09-28 ENCOUNTER — ASOB RESULT (OUTPATIENT)
Age: 43
End: 2023-09-28

## 2023-09-28 VITALS
HEIGHT: 63 IN | DIASTOLIC BLOOD PRESSURE: 62 MMHG | WEIGHT: 153 LBS | SYSTOLIC BLOOD PRESSURE: 97 MMHG | BODY MASS INDEX: 27.11 KG/M2

## 2023-09-28 PROCEDURE — 99213 OFFICE O/P EST LOW 20 MIN: CPT

## 2023-09-28 PROCEDURE — 76815 OB US LIMITED FETUS(S): CPT | Mod: 59

## 2023-09-28 PROCEDURE — 76817 TRANSVAGINAL US OBSTETRIC: CPT

## 2023-09-30 ENCOUNTER — NON-APPOINTMENT (OUTPATIENT)
Age: 43
End: 2023-09-30

## 2023-09-30 LAB — HCG SERPL-MCNC: ABNORMAL MIU/ML

## 2023-10-05 ENCOUNTER — APPOINTMENT (OUTPATIENT)
Dept: OBGYN | Facility: CLINIC | Age: 43
End: 2023-10-05
Payer: MEDICAID

## 2023-10-05 ENCOUNTER — APPOINTMENT (OUTPATIENT)
Dept: ANTEPARTUM | Facility: CLINIC | Age: 43
End: 2023-10-05
Payer: MEDICAID

## 2023-10-05 VITALS — WEIGHT: 153 LBS | BODY MASS INDEX: 27.1 KG/M2 | DIASTOLIC BLOOD PRESSURE: 69 MMHG | SYSTOLIC BLOOD PRESSURE: 120 MMHG

## 2023-10-05 DIAGNOSIS — O02.1 MISSED ABORTION: ICD-10-CM

## 2023-10-05 PROCEDURE — 76815 OB US LIMITED FETUS(S): CPT | Mod: 59

## 2023-10-05 PROCEDURE — 99213 OFFICE O/P EST LOW 20 MIN: CPT

## 2023-10-05 PROCEDURE — 76817 TRANSVAGINAL US OBSTETRIC: CPT

## 2023-10-05 RX ORDER — MISOPROSTOL 200 UG/1
200 TABLET ORAL
Qty: 8 | Refills: 0 | Status: ACTIVE | COMMUNITY
Start: 2023-10-05 | End: 1900-01-01

## 2023-10-05 RX ORDER — MIFEPRISTONE 200 MG/1
200 TABLET ORAL
Refills: 0 | Status: COMPLETED | OUTPATIENT
Start: 2023-10-05

## 2023-10-05 RX ADMIN — Medication 0 MG: at 00:00

## 2023-10-19 ENCOUNTER — APPOINTMENT (OUTPATIENT)
Dept: OBGYN | Facility: CLINIC | Age: 43
End: 2023-10-19

## 2023-10-19 ENCOUNTER — APPOINTMENT (OUTPATIENT)
Dept: ANTEPARTUM | Facility: CLINIC | Age: 43
End: 2023-10-19

## 2024-01-04 NOTE — ED ADULT TRIAGE NOTE - NS ED NURSE BANDS TYPE
Medication Management Clinic  [facility]  Anticoagulation Clinic  [phone] (phone)  [phone] (fax)    Ms. Beverly Solaers is a 69 y.o.  female with history of Factor V Leiden who presents today for anticoagulation monitoring and adjustment.    Patient verifies current dosing regimen and tablet strength.  No missed or extra doses.  Patient denies s/s bleeding/bruising/swelling/SOB  No blood in urine or stool.  No dietary changes.  No changes in medication/OTC agents/Herbals.  No change in alcohol use or tobacco use.  No change in activity level.  Patient denies falls.  No vomiting/diarrhea or acute illness.   No Procedures scheduled in the future at this time.  Pt was previously a 4 week check at Dr. George's.    Assessment:   Lab Results   Component Value Date    INR 2.30 (H) 01/04/2024    INR 2.40 (H) 12/29/2023    INR 1.80 (H) 12/26/2023     INR therapeutic   Recent Labs     01/04/24  1615   INR 2.30*      Patient interview completed and discussed with pharmacist by Demetra Coats PharmD Candidate      Plan:  Continue Coumadin 5mg daily.  Recheck INR in 3 week(s).  Patient reminded to call the Anticoagulation Clinic with any signs or symptoms of bleeding or with any medication changes.  Patient given instructions utilizing the teach back method.      After visit summary printed and reviewed with patient.      Discharged ambulatory in no apparent distress.    For Pharmacy Admin Tracking Only    Time Spent (min): 20       
Name band;